# Patient Record
Sex: MALE | Race: BLACK OR AFRICAN AMERICAN | NOT HISPANIC OR LATINO | Employment: OTHER | URBAN - METROPOLITAN AREA
[De-identification: names, ages, dates, MRNs, and addresses within clinical notes are randomized per-mention and may not be internally consistent; named-entity substitution may affect disease eponyms.]

---

## 2017-02-14 ENCOUNTER — OFFICE VISIT - HEALTHEAST (OUTPATIENT)
Dept: FAMILY MEDICINE | Facility: CLINIC | Age: 27
End: 2017-02-14

## 2017-02-14 DIAGNOSIS — L03.011 PARONYCHIA, FINGER, RIGHT: ICD-10-CM

## 2017-02-14 DIAGNOSIS — L03.90 CELLULITIS: ICD-10-CM

## 2017-04-11 ENCOUNTER — OFFICE VISIT - HEALTHEAST (OUTPATIENT)
Dept: FAMILY MEDICINE | Facility: CLINIC | Age: 27
End: 2017-04-11

## 2017-04-11 DIAGNOSIS — R07.0 THROAT PAIN: ICD-10-CM

## 2017-04-11 DIAGNOSIS — J02.9 EXUDATIVE PHARYNGITIS: ICD-10-CM

## 2017-04-12 ENCOUNTER — COMMUNICATION - HEALTHEAST (OUTPATIENT)
Dept: FAMILY MEDICINE | Facility: CLINIC | Age: 27
End: 2017-04-12

## 2017-11-08 ENCOUNTER — OFFICE VISIT - HEALTHEAST (OUTPATIENT)
Dept: BEHAVIORAL HEALTH | Facility: CLINIC | Age: 27
End: 2017-11-08

## 2017-11-08 DIAGNOSIS — F31.13 BIPOLAR AFFECTIVE DISORDER, MANIC, SEVERE (H): ICD-10-CM

## 2017-11-08 DIAGNOSIS — F31.12 BIPOLAR DISORDER, MANIC, MODERATE (H): ICD-10-CM

## 2017-11-08 ASSESSMENT — MIFFLIN-ST. JEOR: SCORE: 1775.46

## 2017-11-09 ENCOUNTER — OFFICE VISIT - HEALTHEAST (OUTPATIENT)
Dept: FAMILY MEDICINE | Facility: CLINIC | Age: 27
End: 2017-11-09

## 2017-11-09 DIAGNOSIS — F31.2 BIPOLAR AFFECTIVE DISORDER, MANIC, SEVERE, WITH PSYCHOTIC BEHAVIOR (H): ICD-10-CM

## 2017-11-15 ENCOUNTER — OFFICE VISIT - HEALTHEAST (OUTPATIENT)
Dept: BEHAVIORAL HEALTH | Facility: CLINIC | Age: 27
End: 2017-11-15

## 2017-11-15 DIAGNOSIS — F31.11 BIPOLAR 1 DISORDER, MANIC, MILD (H): ICD-10-CM

## 2017-11-15 DIAGNOSIS — F31.12 BIPOLAR DISORDER, MANIC, MODERATE (H): ICD-10-CM

## 2017-11-15 ASSESSMENT — MIFFLIN-ST. JEOR: SCORE: 1798.14

## 2018-01-19 ENCOUNTER — COMMUNICATION - HEALTHEAST (OUTPATIENT)
Dept: BEHAVIORAL HEALTH | Facility: CLINIC | Age: 28
End: 2018-01-19

## 2018-01-19 DIAGNOSIS — F31.13 BIPOLAR AFFECTIVE DISORDER, MANIC, SEVERE (H): ICD-10-CM

## 2018-04-02 ENCOUNTER — COMMUNICATION - HEALTHEAST (OUTPATIENT)
Dept: BEHAVIORAL HEALTH | Facility: CLINIC | Age: 28
End: 2018-04-02

## 2018-04-02 DIAGNOSIS — F31.13 BIPOLAR AFFECTIVE DISORDER, MANIC, SEVERE (H): ICD-10-CM

## 2018-04-02 DIAGNOSIS — F31.12 BIPOLAR DISORDER, MANIC, MODERATE (H): ICD-10-CM

## 2018-04-26 ENCOUNTER — COMMUNICATION - HEALTHEAST (OUTPATIENT)
Dept: BEHAVIORAL HEALTH | Facility: CLINIC | Age: 28
End: 2018-04-26

## 2018-04-26 DIAGNOSIS — F31.13 BIPOLAR AFFECTIVE DISORDER, MANIC, SEVERE (H): ICD-10-CM

## 2018-05-01 ENCOUNTER — OFFICE VISIT - HEALTHEAST (OUTPATIENT)
Dept: BEHAVIORAL HEALTH | Facility: CLINIC | Age: 28
End: 2018-05-01

## 2018-05-01 DIAGNOSIS — F31.13 BIPOLAR AFFECTIVE DISORDER, MANIC, SEVERE (H): ICD-10-CM

## 2018-05-01 DIAGNOSIS — F31.74 BIPOLAR AFFECTIVE DISORDER, MANIC, IN FULL REMISSION (H): ICD-10-CM

## 2018-05-01 ASSESSMENT — MIFFLIN-ST. JEOR: SCORE: 1802.68

## 2018-05-06 ENCOUNTER — COMMUNICATION - HEALTHEAST (OUTPATIENT)
Dept: BEHAVIORAL HEALTH | Facility: CLINIC | Age: 28
End: 2018-05-06

## 2018-05-06 DIAGNOSIS — F31.13 BIPOLAR AFFECTIVE DISORDER, MANIC, SEVERE (H): ICD-10-CM

## 2018-05-07 ENCOUNTER — COMMUNICATION - HEALTHEAST (OUTPATIENT)
Dept: BEHAVIORAL HEALTH | Facility: CLINIC | Age: 28
End: 2018-05-07

## 2018-06-18 ENCOUNTER — COMMUNICATION - HEALTHEAST (OUTPATIENT)
Dept: BEHAVIORAL HEALTH | Facility: CLINIC | Age: 28
End: 2018-06-18

## 2018-08-07 ENCOUNTER — OFFICE VISIT - HEALTHEAST (OUTPATIENT)
Dept: BEHAVIORAL HEALTH | Facility: CLINIC | Age: 28
End: 2018-08-07

## 2018-08-07 DIAGNOSIS — F31.62 BIPOLAR MIXED AFFECTIVE DISORDER, MODERATE (H): ICD-10-CM

## 2018-08-07 DIAGNOSIS — F31.74 BIPOLAR AFFECTIVE DISORDER, MANIC, IN FULL REMISSION (H): ICD-10-CM

## 2018-08-07 ASSESSMENT — MIFFLIN-ST. JEOR: SCORE: 1798.14

## 2018-09-04 ENCOUNTER — OFFICE VISIT - HEALTHEAST (OUTPATIENT)
Dept: BEHAVIORAL HEALTH | Facility: CLINIC | Age: 28
End: 2018-09-04

## 2018-09-04 DIAGNOSIS — F31.62 BIPOLAR MIXED AFFECTIVE DISORDER, MODERATE (H): ICD-10-CM

## 2018-09-04 DIAGNOSIS — F31.61 BIPOLAR 1 DISORDER, MIXED, MILD (H): ICD-10-CM

## 2018-09-04 ASSESSMENT — MIFFLIN-ST. JEOR: SCORE: 1843.5

## 2018-09-05 ENCOUNTER — OFFICE VISIT - HEALTHEAST (OUTPATIENT)
Dept: BEHAVIORAL HEALTH | Facility: CLINIC | Age: 28
End: 2018-09-05

## 2018-09-05 DIAGNOSIS — F31.61 BIPOLAR 1 DISORDER, MIXED, MILD (H): ICD-10-CM

## 2018-12-04 ENCOUNTER — COMMUNICATION - HEALTHEAST (OUTPATIENT)
Dept: BEHAVIORAL HEALTH | Facility: CLINIC | Age: 28
End: 2018-12-04

## 2018-12-04 DIAGNOSIS — F31.62 BIPOLAR MIXED AFFECTIVE DISORDER, MODERATE (H): ICD-10-CM

## 2018-12-05 ENCOUNTER — OFFICE VISIT - HEALTHEAST (OUTPATIENT)
Dept: BEHAVIORAL HEALTH | Facility: CLINIC | Age: 28
End: 2018-12-05

## 2018-12-05 DIAGNOSIS — F31.61 BIPOLAR 1 DISORDER, MIXED, MILD (H): ICD-10-CM

## 2018-12-05 DIAGNOSIS — F31.62 BIPOLAR MIXED AFFECTIVE DISORDER, MODERATE (H): ICD-10-CM

## 2018-12-05 DIAGNOSIS — F31.74 BIPOLAR AFFECTIVE DISORDER, MANIC, IN FULL REMISSION (H): ICD-10-CM

## 2018-12-05 DIAGNOSIS — F39 MOOD DISORDER (H): ICD-10-CM

## 2018-12-05 DIAGNOSIS — F30.9 MANIA (H): ICD-10-CM

## 2018-12-05 ASSESSMENT — MIFFLIN-ST. JEOR: SCORE: 1848.04

## 2019-01-15 ENCOUNTER — COMMUNICATION - HEALTHEAST (OUTPATIENT)
Dept: BEHAVIORAL HEALTH | Facility: CLINIC | Age: 29
End: 2019-01-15

## 2019-02-01 ENCOUNTER — COMMUNICATION - HEALTHEAST (OUTPATIENT)
Dept: BEHAVIORAL HEALTH | Facility: CLINIC | Age: 29
End: 2019-02-01

## 2019-04-09 ENCOUNTER — COMMUNICATION - HEALTHEAST (OUTPATIENT)
Dept: BEHAVIORAL HEALTH | Facility: CLINIC | Age: 29
End: 2019-04-09

## 2020-10-14 ENCOUNTER — COMMUNICATION - HEALTHEAST (OUTPATIENT)
Dept: BEHAVIORAL HEALTH | Facility: CLINIC | Age: 30
End: 2020-10-14

## 2020-10-21 ENCOUNTER — OFFICE VISIT - HEALTHEAST (OUTPATIENT)
Dept: BEHAVIORAL HEALTH | Facility: CLINIC | Age: 30
End: 2020-10-21

## 2020-10-21 DIAGNOSIS — F31.64 BIPOLAR DISORDER, CURRENT EPISODE MIXED, SEVERE, WITH PSYCHOTIC FEATURES (H): ICD-10-CM

## 2020-10-21 DIAGNOSIS — F31.62 BIPOLAR MIXED AFFECTIVE DISORDER, MODERATE (H): ICD-10-CM

## 2020-10-23 ENCOUNTER — COMMUNICATION - HEALTHEAST (OUTPATIENT)
Dept: BEHAVIORAL HEALTH | Facility: CLINIC | Age: 30
End: 2020-10-23

## 2020-10-23 DIAGNOSIS — F31.9 BIPOLAR I DISORDER (H): ICD-10-CM

## 2020-11-10 ENCOUNTER — COMMUNICATION - HEALTHEAST (OUTPATIENT)
Dept: BEHAVIORAL HEALTH | Facility: CLINIC | Age: 30
End: 2020-11-10

## 2020-11-10 PROBLEM — F31.61 BIPOLAR 1 DISORDER, MIXED, MILD (H): Status: ACTIVE | Noted: 2018-09-04

## 2020-11-16 ENCOUNTER — COMMUNICATION - HEALTHEAST (OUTPATIENT)
Dept: BEHAVIORAL HEALTH | Facility: CLINIC | Age: 30
End: 2020-11-16

## 2021-04-01 ENCOUNTER — AMBULATORY - HEALTHEAST (OUTPATIENT)
Dept: BEHAVIORAL HEALTH | Facility: CLINIC | Age: 31
End: 2021-04-01

## 2021-05-27 NOTE — TELEPHONE ENCOUNTER
Patient presented in clinic to cancel future appointments stating that he will no longer be following Dr. William.

## 2021-05-30 VITALS — WEIGHT: 188.9 LBS

## 2021-05-30 VITALS — WEIGHT: 194.7 LBS

## 2021-05-31 VITALS — HEIGHT: 71 IN | BODY MASS INDEX: 24.64 KG/M2 | WEIGHT: 176 LBS

## 2021-05-31 VITALS — BODY MASS INDEX: 24.81 KG/M2 | WEIGHT: 177.9 LBS

## 2021-05-31 VITALS — BODY MASS INDEX: 25.34 KG/M2 | WEIGHT: 181 LBS | HEIGHT: 71 IN

## 2021-06-01 VITALS — WEIGHT: 182 LBS | HEIGHT: 71 IN | BODY MASS INDEX: 25.48 KG/M2

## 2021-06-01 VITALS — HEIGHT: 71 IN | BODY MASS INDEX: 25.34 KG/M2 | WEIGHT: 181 LBS

## 2021-06-02 VITALS — BODY MASS INDEX: 26.88 KG/M2 | HEIGHT: 71 IN | WEIGHT: 192 LBS

## 2021-06-02 VITALS — BODY MASS INDEX: 26.74 KG/M2 | HEIGHT: 71 IN | WEIGHT: 191 LBS

## 2021-06-08 NOTE — PROGRESS NOTES
Assessment/Plan:   Cellulitis and Paronychia, finger, right index finger  Progression from cellulitis to increased swelling, pain and pus pocket at medial base of nail despite oral antibiotic treatment with cephalexin QID and soaking.  Lanced along the nail to release a lot of pus drainage - sample sent for culture.  Discussed with the [facility] PA on call and we will add bactrim, continue soaks and he will see them tomorrow at 3pm.  - sulfamethoxazole-trimethoprim (SEPTRA DS) 800-160 mg per tablet; Take 1 tablet by mouth 2 (two) times a day for 7 days.  Dispense: 14 tablet; Refill: 0  - Culture, Wound  - Ambulatory referral to Orthopedics    Continue pain medication as ordered by ER  Continue cephalexin QID  Soak finger 4 times a day and reapply dressing if desired  Add Bactrim twice a day, start tonight  Probiotics or yogurt  See [facility] tomorrow at 3pm - appointment made, directions given.   Follow up as needed    Subjective:      Tavo Adorno is a 27 y.o. male who presents with increased pain and swelling of the right 3rd finger near the tip.  There is pus area along the medial nail.  He has had pain in this area for 5-6 days, no apparent injury.  There was a pus area initially and he popped it with a needle and then the redness and swelling started getting worse.  He was seen in the ED 2 days ago and started on cephalexin but it hasn't gotten any better.  In fact the pain is more intense and there is a new pus area along the nail.  No fever or chills.  No malaise or systemic symptoms.  No pain or redness extending into the hand but there is pain at the DIP joint with motion.  He is generally healthy otherwise, no routine medications and no primary clinic.  He was unable to take the percocet given in the ER for pain because he was doing errands all day and driving and that may be partly why the pain seems worse.  NKDA    Objective:     Visit Vitals     /76 (Patient Site: Right Arm,  Patient Position: Sitting)     Pulse 70     Temp 98.1  F (36.7  C) (Oral)     Resp 20     Wt 194 lb 11.2 oz (88.3 kg)     SpO2 98%       Physical  General Appearance: Alert, cooperative, no distress but uncomfortable  Head: Normocephalic, without obvious abnormality, atraumatic  Psychiatric: Patient has a normal mood and affect.   Extremities: the right 3rd finger is red and swollen at the tip giving it a club like appearance.  The pad of the tip is indurated, but does not appear to be fluctuant.  Pressure on the tip is painful but mainly at the medial side of the nail where the pus pocket has collected. No oozing.  No subungual hematoma.  The redness and swelling extend proximal to the DIP and swelling involves the entire finger. He has pain with flexion and extension of the DIP, much less so at the PIP and MCP.  No tenderness along the flexor tendon sheaths.  No significant nail deformity or obvious nail shards at the medial corner.   The finger was soaked in soapy warm water and then after an alcohol wipe a 21 gauge needle was inserted horizontally through the callous and into the pus pocket from the side of the nail.  Purulent matter easily came out and was sent for culture and the opening enlarged with the bevel of the needle to allow more pus to drain out.  He felt considerable relief of the pain and pressure in the finger.  There was scant red blood.  The wound was covered with bacitracin and a bulky dressing applied.  He tolerated the procedure well, no complications and insignificant blood loss.

## 2021-06-10 NOTE — PROGRESS NOTES
Subjective:      Patient ID: Tavo Adorno is a 27 y.o. male.    Chief Complaint:    HPI  Tavo Adorno is a 27 y.o. male who presents today complaining of sore throat for 5 days.   He had fever that is resolved now.  He didn't check a temp but had sweats and chills.  He has had some congestion but no cough.  No one else at home has been ill.      History reviewed. No pertinent past medical history.    Past Surgical History:   Procedure Laterality Date     MIDDLE EAR SURGERY         No family history on file.    Social History   Substance Use Topics     Smoking status: Current Every Day Smoker     Types: Cigarettes     Smokeless tobacco: None     Alcohol use None       Review of Systems    Objective:     /64  Pulse 90  Temp 98.6  F (37  C) (Oral)   Wt 188 lb 14.4 oz (85.7 kg)  SpO2 97%    Physical Exam   Constitutional: He appears well-developed and well-nourished. No distress.   HENT:   Right Ear: Tympanic membrane and ear canal normal.   Left Ear: Tympanic membrane and ear canal normal.   Mouth/Throat: Mucous membranes are normal. Oropharyngeal exudate, posterior oropharyngeal edema (Tonsills 4 + Bilaterally; not touching) and posterior oropharyngeal erythema present. No tonsillar abscesses.   Eyes: Conjunctivae are normal.   Neck: Normal range of motion. Neck supple.   Cardiovascular: Normal rate and regular rhythm.    No murmur heard.  Pulmonary/Chest: Effort normal and breath sounds normal. No respiratory distress. He has no wheezes. He has no rales.   Abdominal: Soft. Bowel sounds are normal. There is no hepatosplenomegaly. There is no tenderness.   Lymphadenopathy:     He has cervical adenopathy (large anterior cervical lymph nodes bilaterally).   Skin: No rash noted.       Procedures    Results for orders placed or performed in visit on 04/11/17   Rapid Strep A Screen-Throat   Result Value Ref Range    Rapid Strep A Antigen No Group A Strep detected No Group A Strep detected   Influenza A/B Rapid  Test   Result Value Ref Range    Influenza  A, Rapid Antigen No Influenza A antigen detected No Influenza A antigen detected    Influenza B, Rapid Antigen No Influenza B antigen detected No Influenza B antigen detected   Mononucleosis Screen   Result Value Ref Range    Mono Screen Negative Negative        Assessment / Plan:     1. Exudative pharyngitis  predniSONE (DELTASONE) 20 MG tablet    Culture, Throat     Viral etiology is most likely.  With symmetric swelling, abscess is unlikely.  The patient is actually starting to improve at this time and his fever has resolved.  Given the level of discomfort and swelling, I am starting him on prednisone 20 mg twice daily.  Continue with other symptomatic measures as below.  I did collect a throat culture to check for other potential bacterial etiologies and we will contact him with results.    Patient Instructions   1) Prednisone 20 mg twice daily.  2) Drink plenty of fluids.  3) Throat culture pending, we will notify you of results.  4) Ibuprofen or tylenol as needed.  5) Follow up in 7-10 days if not improving, sooner if worsening or other concerns.
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
## 2021-06-12 NOTE — PROGRESS NOTES
________________________________________  Medications Phoned  to Pharmacy [] yes [x]no  Name of Pharmacist:  List Medications, including dose, quantity and instructions    Medications ordered this visit were e-scribed.  Verified by order class [] yes  [x] no   Seroquel is pending but not signed.   Medication changes or discontinuations were communicated to patient's pharmacy: [] yes  [x] no    Dictation completed at time of chart check: [] yes  [x] no    I have checked the documentation for today s encounters and the above information has been reviewed and completed.

## 2021-06-12 NOTE — TELEPHONE ENCOUNTER
This pt and wife called wanting to get back into see Dr. William. Please let me know if this is ok so I can reach out to pt and schedule. Thank you

## 2021-06-12 NOTE — TELEPHONE ENCOUNTER
Reason for call:  Medication If this is a refill request, has the caller requested the refill from the pharmacy already?   Yes  Will the patient be using a [facility]? No  Name of the pharmacy and phone number for the current request:   [facility] in [place]    Name of the medication requested: Seroquel    Other request: Pt reports the pharmacy has not received this rx. Would like to follow-up.    Phone number to reach patient:  [phone]      Best Time:  Anytime    Can we leave a detailed message on this number? Yes

## 2021-06-12 NOTE — TELEPHONE ENCOUNTER
Patient pharmacy has not received Seroquel script/prescription. Seroquel shown in chart is from 2018. According to provider's plan for patient:    This is a 30 y.o. male with bipolar disorder, most recent manic phase      Patient and I reviewed diagnosis and treatment plan and patient agrees with following recommendations:     1.Patient will take the medications as prescribed.      Start Seroquel  100 mg  every night for mood stabilization, disorganized thinking   Patient will not stop taking medications or adjust them without consulting with the provider.  2.Patient will call with any problems between 2 visits.  3.Patient will go to the emergency room if not feeling safe , unable to function in the community, or if suicidal, homicidal or hearing voices or having paranoia.  4.Patient will abstain from drugs and alcohol.  5.Patient will not drive if sedated on medications or under influence of any substance.   6.Patient will not mix psychiatric medications with drugs and alcohol.

## 2021-06-13 NOTE — TELEPHONE ENCOUNTER
Staff from [facility] called informing the clinic that patient is at [facility] and will be discharging today. Stated patient needs to be scheduled for an Invega injection.     She was told that no CARINA on file for communication and patient needs to speak with provider by scheduling an appointment or call the clinic himself.     [facility] staff will fax the order to the clinic.

## 2021-06-13 NOTE — TELEPHONE ENCOUNTER
[facility] from [facility] came in fax. States patient have appt with provider on 11/24/2020; however, there is no appt in chart for patient.  Will place inHi-Dis([facility])der's mailbox for review.     Central scheduling, pls call patient and schedule follow up appt with provider. See directives from virtual visit on 10/21/2020.     From plan:  9. Next appointment in 1 month
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
## 2021-06-14 NOTE — PROGRESS NOTES
Assessment/Plan:        Tavo Adorno is a 27 y.o. male with recent hospital admission for bipolar affective disorder with severe psychotic behavior discharged on 11/6/2017.   Reportedly patient has presented with mood swings with progressive worsening with a history of manic disorder.  Patient is currently living with his girlfriend who is calling her sister, And was started on Depakote 700 mg ER taken nightly, Seroquel 100 mg 4 times a day as needed for agitation and anxiety and 200 mg Seroquel at night for mood stabilization.     He has no concerns today and feels that overall mood is improving denies noting any intolerance or side effects.   Hospital admission notes and discharge summary along with today's exams were discussed and reviewed with the patient      Meds have been reconciled.   New meds prescribed today: no   Med changes today: no   Meds pending Specialty consultation: no       Continue current management per psychiatry   Follow up with psychiatry       Subjective:    Patient ID:    Tavo Adorno is a 27 y.o. male with recent hospital admission for bipolar affective disorder with severe psychotic behavior discharged on 11/6/2017.   Reportedly patient has presented with mood swings with progressive worsening with a history of manic disorder.  Patient is currently living with his girlfriend who is calling her sister, And was started on Depakote 700 mg ER taken nightly, Seroquel 100 mg 4 times a day as needed for agitation and anxiety and 200 mg Seroquel at night for mood stabilization.         allergies, current medications, past family history, past medical history, past social history, past surgical history and problem list.    Review of Systems  A 12 point comprehensive review of systems was negative except as noted.            Objective:   /76  Pulse 76  Wt 177 lb 14.4 oz (80.7 kg)  SpO2 99%  BMI 24.81 kg/m2      Physical Exam  General Appearance:    Alert, cooperative, no distress,     Throat:   Lips, mucosa, and tongue normal; teeth and gums normal   Neck:   Supple, symmetrical, trachea midline, no adenopathy;     thyroid:  no enlargement/tenderness/nodules;    Lungs:     Clear to auscultation bilaterally, respirations unlabored    Heart:    Regular rate and rhythm, S1 and S2 normal, no murmur, rub   or gallop   Abdomen:  Soft, NT, ND, NABS.    Skin:   Skin color, texture, turgor normal, no rashes or lesions                Psych/ NEURO:  Mental status: Alert, oriented, thought content appropriate,                                              affect: normal, thought content exhibits logical connections    Grossly intact
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
## 2021-06-14 NOTE — PROGRESS NOTES
"Sister wasn't able to come for appt today with patient.  Reports he is taking meds, uses Seroquel 100 mg at least daily, usually in the evening. Reports things are \"beautiful\" since he has been in the hospital.          Correct pharmacy verified with patient and confirmed in snapshot? [x] yes []no    Charge captured ? [x] yes  [] no    Medications Phoned  to Pharmacy [] yes [x]no  Name of Pharmacist:  List Medications, including dose, quantity and instructions      Medication Prescriptions given to patient   [] yes  [x] no   List the name of the drug the prescription was written for.       Medications ordered this visit were e-scribed.  Verified by order class [x] yes  [] no    Medication changes or discontinuations were communicated to patient's pharmacy: [x] yes  [] no  Discontinues: Depakote  Increased: Seroquel 300 mg q HS    UA collected [] yes  [x] no    [place] Prescription Monitoring Program Reviewed? [x] yes  [] no    Referrals were made to:  none  Future appointment was made: [x] yes  [] no    Dictation completed at time of chart check: [x] yes  [] no    I have checked the documentation for today s encounters and the above information has been reviewed and completed.    "

## 2021-06-17 NOTE — PROGRESS NOTES
Correct pharmacy verified with patient and confirmed in snapshot? [x] yes []no    Medications Phoned  to Pharmacy [] yes [x]no  Name of Pharmacist:  List Medications, including dose, quantity and instructions      Medication Prescriptions given to patient   [] yes  [x] no   List the name of the drug the prescription was written for.      Medications ordered this visit were e-scribed.  Verified by order class [x] yes  [] no  Seroquel    Medication changes or discontinuations were communicated to patient's pharmacy:  [] yes  [x] no  Pharmacist Spoke With:     UA collected [] yes  [x] no    [place] Prescription Monitoring Program Reviewed? [x] yes  [] no    Referrals/Labs were made to:     Completed Charge Capture?  [x] yes  [] no    Future appointment was made: [x] yes  [] no  8/7/18  Dictation completed at time of chart check: [] yes  [x] no    I have checked the documentation for today s encounters and the above information has been reviewed and completed.
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
## 2021-06-19 NOTE — PROGRESS NOTES
Patient here today for follow up of medication management. States he is not currently taking any medications. States he would not like to be on any. States feels way better being off.    [employer] [place] Date: 18  Query Report Page#: 1  Patient Rx History Report  SAÚL QUIROZ  Search Criteria: Last Name 'saúl' and First Name 'jeremie' and  =  and Request Period =  to  ' - 2 out of 2 Recipients Selected.  Fill Date Product, Str, Form Qty Days Pt ID Prescriber Written RX# N/R* Pharm **MED+  ---------- -------------------------------- ------------ ---- --------- ---------- ---------- ------------ ----- --------- ------  2018 HYDROCODONE-ACETAMIN 5-325 MG [id number] 2 [id number] 2018 [id number] N [id number] 25.0  *N/R N=New R=Refill  +MED Daily  Prescribers for prescriptions listed  ----------------------------------------------------------------------------------------------------------------------------------  [id number] BRITANY HUDSON MD; [facility], [address] [place], [place] [id number]  Pharmacies that dispensed prescriptions listed  ----------------------------------------------------------------------------------------------------------------------------------  [id number] [facility]; : [facility] # [id number], [address] [facility] [address],  Patients that match search criteria  ----------------------------------------------------------------------------------------------------------------------------------  [id number] SAÚL QUIROZ,  90; [address] [facility] [address]  [id number] SAÚL QUIROZ,  90; [address] , [place] [address]  MED Summary  This section displays cumulative MED values by unique recipient. The MED Max value is the maximum occurrence of cumulative MED  sustained for any 3 consecutive days. This value is calculated based on prescriptions dispensed during the date range  requested.  -----------------------------------------------------------------------------------------------------------------------------------  0 GABRIELLA HAYS; 1990; [address], [place] [address]    Correct pharmacy verified with patient and confirmed in snapshot? [x] yes []no    Charge captured ? [x] yes  [] no    Medications Phoned  to Pharmacy [] yes [x]no  Name of Pharmacist:  List Medications, including dose, quantity and instructions      Medication Prescriptions given to patient   [] yes  [x] no   List the name of the drug the prescription was written for.       Medications ordered this visit were e-scribed.  Verified by order class [x] yes  [] no  Seroquel 100 mg    Medication changes or discontinuations were communicated to patient's pharmacy: [] yes  [x] no    UA collected [] yes  [x] no    [place] Prescription Monitoring Program Reviewed? [x] yes  [] no    Referrals were made to: none     Future appointment was made: [x] yes  [] no    Dictation completed at time of chart check: [] yes  [x] no    I have checked the documentation for today s encounters and the above information has been reviewed and completed.
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
## 2021-06-20 NOTE — PROGRESS NOTES
Patient here today for follow up of medication management.     [employer] [place] Date: 18  Query Report Page#: 1  Patient Rx History Report  SAÚL QUIROZ  Search Criteria: Last Name 'saúl' and First Name 'jeremie' and  = ' and Request Period = ' to  ' - 2 out of 2 Recipients Selected.  Fill Date Product, Str, Form Qty Days Pt ID Prescriber Written RX# N/R* Pharm **MED+  ---------- -------------------------------- ------------ ---- --------- ---------- ---------- ------------ ----- --------- ------  2018 HYDROCODONE-ACETAMIN 5-325 MG [id number] 2 [id number] 2018 [id number] N [id number] 25.0  *N/R N=New R=Refill  +MED Daily  Prescribers for prescriptions listed  ----------------------------------------------------------------------------------------------------------------------------------  [id number] BRITANY HUDSON MD; [facility], [address] [place] [id number]  Pharmacies that dispensed prescriptions listed  ----------------------------------------------------------------------------------------------------------------------------------  [id number] [facility]; : [facility] # [id number][facility] [address] [facility] [address],  Patients that match search criteria  ----------------------------------------------------------------------------------------------------------------------------------  [id number] SAÚL QUIROZ,  90; [address] [facility] [id number]  [id number] SAÚL QUIROZ,  90; [address], [place] [address]  MED Summary  This section displays cumulative MED values by unique recipient. The MED Max value is the maximum occurrence of cumulative MED  sustained for any 3 consecutive days. This value is calculated based on prescriptions dispensed during the date range  requested.  -----------------------------------------------------------------------------------------------------------------------------------  0 GABRIELLA HAYS; 1990; [address], [place] [address]    Correct pharmacy verified with patient and confirmed in snapshot? [x] yes []no    Charge captured ? [x] yes  [] no    Medications Phoned  to Pharmacy [] yes [x]no  Name of Pharmacist:  List Medications, including dose, quantity and instructions      Medication Prescriptions given to patient   [] yes  [x] no   List the name of the drug the prescription was written for.       Medications ordered this visit were e-scribed.  Verified by order class [x] yes  [] no  Seroquel 100 mg    Medication changes or discontinuations were communicated to patient's pharmacy: [] yes  [x] no    UA collected [] yes  [x] no    [place] Prescription Monitoring Program Reviewed? [x] yes  [] no    Referrals were made to:none      Future appointment was made: [x] yes  [] no    Dictation completed at time of chart check: [] yes  [x] no    I have checked the documentation for today s encounters and the above information has been reviewed and completed.

## 2021-06-20 NOTE — PROGRESS NOTES
"Mental Health Visit Note    Date of Service: 2018    Start time: 9:00 AM    Stop time: 9:55 AM   Session # 1    Patient, a 28 year old male, is being seen today for initial intake appointment for individual psychotherapy. He was referred by his psychiatric medication provider, Dr. William.    Psychotherapy type: Individual.    New symptoms or complaints:  None noted. See \"Clinical assessments\" below.    Functional Impairment:   Patient identifies the how daily stressors affect daily functioning in today's session as follows (Scale is 1-4, 1=not at all or rarely; 4=extremely so/every day.)    Personal: 2  Family: 2  Social: 2  Work: 3    Clinical assessment of mental status:   Patient presented on time. Patient was oriented x3, open and cooperative, and dressed appropriately for this session and weather. Patient s memory and cognitive functioning were within normal. Speech and language were within normal. Concentration and focus were within normal. Psychosis was not observed or reported. Patient s reported mood was neutral. Affect was apathetic and was restricted in intensity. Fund of knowledge was adequate. Insight was adequate for therapy.    Suicidal/homicidal ideation present:   Patient denied suicidal and homicidal ideation, intent or plans.     Patient's impression of their current status:  Patient provided overview of his mental health status and treatment, living and family situation. Patient is [place], immigrated at age 10. Father , mother remarried. He has 20 half-siblings between his father and mother; he is oldest of his mother's 14 children, the only child of his mother and father together. Patient lived in [facility] [place] [facility] where his family continues to live. He moved to [place] about 3 years ago. He is / from first wife, with whom he has 7 y/o daughter. He had his daughter over the summer, she lives primarily with her mother in [facility]. Patient lives with his girlfriend, her sister " and mother in her mother's house in [place]. Patient has not worked in about one year. 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 His girlfriend (he calls her his wife, but they are not legally ) had been working until recently. She will soon give birth to their child. Patient indicated that he wants to work eventually. Patient was diagnosed with bipolar 1 disorder in October 2017. He was admitted to [facility] during a manic episode, put on a 72 hour hold, and was discharged 8 days later. Patient has been stable on Seroquel; currently taking 300 mg at night. He denies drug and alcohol use, but records indicate that in past he drank rarely and sometimes smoked cannabis. He reportedly was never suicidal, homicidal. During manic phase he had delusions of grandeur, but no hallucinations. Patient expressed interest in using therapy to work on personal matters related to life issues/stressors. He took DA materials to complete for next session.    Therapist impression of patient's current state:   Patient has history of bipolar disorder apparently first diagnosed in October 2017 when he presented to ED in actively manic state. He appears stable. Patient has been unemployed for about one year, has been living on his partner's earnings and help from her mother and his mother. He expressed interest in returning to work sometime after their baby is born (in a week or so), but patient seemed to have little concern about not working and expressed that he did not like work he had done before. 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 Patient may have an adjustment disorder, but symptoms related to current stressors are not yet clear. DA will help clarify.     Type of psychotherapeutic techniques provided:   Client-centered  Supportive    Progress toward short term goals:   None as this was first session.    Review of long term goals:   Not done this session as this  was initial intake session.    Diagnosis:   Bipolar 1 disorder, mixed, mild    Plan and follow-up:   Patient will follow safety plan of seeking help from friend/family, calling [facility] crisis, calling 911, and/or presenting to the ED if necessary.  Patient will be compliant with medications and attend appointments as scheduled.  Patient will return for f/u psychotherapy in 1-2 weeks. He will bring completed DA materials.     Discharge criteria/planning:   Patient will continue with follow-up until therapy can be discontinued without return of signs and symptoms.    Signature:   Performed and documented by RASHAUN Nance

## 2021-06-22 NOTE — PROGRESS NOTES
Pt here for psychiatric follow up and medication management.  Discus performed today Score: 0; due again 6/2019    Mood- beautiful   No new concerns at this time.    Denies SI/HI thoughts at this time.       [place] :  Unable to obtain for visit today due to recent process change with  program.        Correct pharmacy verified with patient and confirmed in snapshot? [x] yes []no    Charge captured ? [x] yes  [] no    Medications Phoned  to Pharmacy [] yes [x]no  Name of Pharmacist:  List Medications, including dose, quantity and instructions      Medication Prescriptions given to patient   [] yes  [x] no   List the name of the drug the prescription was written for.       Medications ordered this visit were e-scribed.  Verified by order class [x] yes  [] no   Seroquel 100 mg   Medication changes or discontinuations were communicated to patient's pharmacy: [] yes  [x] no    UA collected [] yes  [x] no    [place] Prescription Monitoring Program Reviewed? [] yes  [x] no   New Program provider is currently not renrolled.  Referrals were made to:  None    Future appointment was made: [x] yes  [] no   03/12/2019  Dictation completed at time of chart check: [] yes  [x] no    I have checked the documentation for today s encounters and the above information has been reviewed and completed.
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
## 2021-06-23 NOTE — TELEPHONE ENCOUNTER
Patient came to the clinic to let Dr. William know he went down to one Seroquel at bedtime a week and a half ago instead of the three.  He states things are much better and not so much drama in his life.  He will let us know if he feels he needs to go back up on it , but feels the same taking one. If we need to call the patient back he does not want us to leave a message on his phone.  He wants to be talked to directly.

## 2021-06-23 NOTE — TELEPHONE ENCOUNTER
"PATIENT PRESENTING IN CLINIC TO CANCEL HIS UPCOMING APPOINTMENT - STATES \"NOT NEEDED\"; ALSO NOT TAKING MEDICATIONS  "

## 2021-06-29 NOTE — PROGRESS NOTES
Progress Notes by Mackenzie Miller CMA at 10/21/2020 10:45 AM     Author: Mackenzie Miller CMA Service: -- Author Type: Certified Medical Assistant    Filed: 11/13/2020 10:20 PM Encounter Date: 10/21/2020 Status: Signed    : Mackenzie Miller CMA (Certified Medical Assistant)       This video/telephone visit will be conducted via a call between you and your physician/provider. We have found that certain health care needs can be provided without the need for an in-person physical exam. This service lets us provide the care you need with a video /telephone conversation. If a prescription is necessary we can send it directly to your pharmacy. If lab work is needed we can place an order for that and you can then stop by our lab to have the test done at a later time.   Just as we bill insurance for in-person visits, we also bill insurance for video/telephone visits. If you have questions about your insurance coverage, we recommend that you speak with your insurance company.   Patient has given verbal consent for video/Telephone visit? yes  Patient would like telephone visit please call:  Must dial an [phone]   Mackenzie JEWELL CMA   Pt was last seen by provider 12/2018 and then in 4/2019 came into office and fired provider.  Pt and his wife called in 10/20/2020 requesting return to provider for medication management.   Pt would to restart Seroquel but at a lower dose. Pt up till recently he is having issues with falling asleep.   Denies SI/HI thoughts at this time.     Patient verified allergies, medications and pharmacy via phone.  Patient states he is ready for visit.  [place]  was reviewed prior to seeing patient today. See below for embedded report.

## 2021-07-03 NOTE — ADDENDUM NOTE
Addendum Note by Bethel Ponce MD at 4/4/2018  4:49 PM     Author: Bethel Ponce MD Service: -- Author Type: Physician    Filed: 4/4/2018  4:49 PM Encounter Date: 4/2/2018 Status: Signed    : Bethel Ponce MD (Physician)    Addended by: BETHEL PONCE on: 4/4/2018 04:49 PM        Modules accepted: Orders

## 2021-07-14 PROBLEM — F31.2 BIPOLAR AFFECTIVE DISORDER, MANIC, SEVERE, WITH PSYCHOTIC BEHAVIOR (H): Status: RESOLVED | Noted: 2017-10-30 | Resolved: 2018-05-01

## 2021-07-14 PROBLEM — F31.12 BIPOLAR I DISORDER, MOST RECENT EPISODE (OR CURRENT) MANIC, MODERATE (H): Status: RESOLVED | Noted: 2017-11-06 | Resolved: 2018-05-01

## 2021-07-14 PROBLEM — F31.13 BIPOLAR AFFECTIVE DISORDER, MANIC, SEVERE (H): Status: RESOLVED | Noted: 2017-10-29 | Resolved: 2017-11-08

## 2024-04-10 NOTE — TELEPHONE ENCOUNTER
I left a message for patient to call us   What Type Of Note Output Would You Prefer (Optional)?: Bullet Format How Severe Is Your Skin Lesion?: moderate Has Your Skin Lesion Been Treated?: not been treated Is This A New Presentation, Or A Follow-Up?: Skin Lesion